# Patient Record
Sex: FEMALE | Race: AMERICAN INDIAN OR ALASKA NATIVE | NOT HISPANIC OR LATINO | Employment: OTHER | URBAN - METROPOLITAN AREA
[De-identification: names, ages, dates, MRNs, and addresses within clinical notes are randomized per-mention and may not be internally consistent; named-entity substitution may affect disease eponyms.]

---

## 2017-01-17 ENCOUNTER — HOSPITAL ENCOUNTER (EMERGENCY)
Facility: HOSPITAL | Age: 47
Discharge: HOME | End: 2017-01-17
Attending: EMERGENCY MEDICINE
Payer: COMMERCIAL

## 2017-01-17 VITALS
HEART RATE: 69 BPM | HEIGHT: 61 IN | WEIGHT: 120 LBS | RESPIRATION RATE: 18 BRPM | DIASTOLIC BLOOD PRESSURE: 79 MMHG | TEMPERATURE: 98 F | OXYGEN SATURATION: 98 % | SYSTOLIC BLOOD PRESSURE: 115 MMHG | BODY MASS INDEX: 22.66 KG/M2

## 2017-01-17 DIAGNOSIS — H66.90 OTITIS MEDIA, UNSPECIFIED CHRONICITY, UNSPECIFIED LATERALITY, UNSPECIFIED OTITIS MEDIA TYPE: Primary | ICD-10-CM

## 2017-01-17 DIAGNOSIS — R07.9 CHEST PAIN: ICD-10-CM

## 2017-01-17 LAB
ANION GAP SERPL CALC-SCNC: 13 MMOL/L
BASOPHILS # BLD AUTO: 0.02 K/UL
BASOPHILS NFR BLD: 0.2 %
BUN SERPL-MCNC: 16 MG/DL
CALCIUM SERPL-MCNC: 8.6 MG/DL
CHLORIDE SERPL-SCNC: 111 MMOL/L
CO2 SERPL-SCNC: 23 MMOL/L
CREAT SERPL-MCNC: 0.8 MG/DL
DIFFERENTIAL METHOD: ABNORMAL
EOSINOPHIL # BLD AUTO: 0.2 K/UL
EOSINOPHIL NFR BLD: 1.9 %
ERYTHROCYTE [DISTWIDTH] IN BLOOD BY AUTOMATED COUNT: 14.7 %
EST. GFR  (AFRICAN AMERICAN): >60 ML/MIN/1.73 M^2
EST. GFR  (NON AFRICAN AMERICAN): >60 ML/MIN/1.73 M^2
GLUCOSE SERPL-MCNC: 85 MG/DL
HCT VFR BLD AUTO: 36.1 %
HGB BLD-MCNC: 12.3 G/DL
INR PPP: 1
LYMPHOCYTES # BLD AUTO: 4.3 K/UL
LYMPHOCYTES NFR BLD: 41 %
MCH RBC QN AUTO: 29.9 PG
MCHC RBC AUTO-ENTMCNC: 34.1 %
MCV RBC AUTO: 88 FL
MONOCYTES # BLD AUTO: 0.6 K/UL
MONOCYTES NFR BLD: 5.7 %
NEUTROPHILS # BLD AUTO: 5.4 K/UL
NEUTROPHILS NFR BLD: 51.2 %
PLATELET # BLD AUTO: 215 K/UL
PMV BLD AUTO: 11.5 FL
POTASSIUM SERPL-SCNC: 3 MMOL/L
PROTHROMBIN TIME: 10.6 SEC
RBC # BLD AUTO: 4.11 M/UL
SODIUM SERPL-SCNC: 147 MMOL/L
TROPONIN I SERPL DL<=0.01 NG/ML-MCNC: <0.006 NG/ML
WBC # BLD AUTO: 10.55 K/UL

## 2017-01-17 PROCEDURE — 80048 BASIC METABOLIC PNL TOTAL CA: CPT

## 2017-01-17 PROCEDURE — 93010 ELECTROCARDIOGRAM REPORT: CPT | Mod: ,,, | Performed by: INTERNAL MEDICINE

## 2017-01-17 PROCEDURE — 84484 ASSAY OF TROPONIN QUANT: CPT

## 2017-01-17 PROCEDURE — 85610 PROTHROMBIN TIME: CPT

## 2017-01-17 PROCEDURE — 93005 ELECTROCARDIOGRAM TRACING: CPT

## 2017-01-17 PROCEDURE — 85025 COMPLETE CBC W/AUTO DIFF WBC: CPT

## 2017-01-17 PROCEDURE — 99284 EMERGENCY DEPT VISIT MOD MDM: CPT | Mod: 25

## 2017-01-17 RX ORDER — FLUOXETINE HYDROCHLORIDE 20 MG/1
20 CAPSULE ORAL DAILY
COMMUNITY
End: 2021-03-08 | Stop reason: SDUPTHER

## 2017-01-17 RX ORDER — PANTOPRAZOLE SODIUM 40 MG/1
40 TABLET, DELAYED RELEASE ORAL DAILY
COMMUNITY
End: 2021-03-08 | Stop reason: SDUPTHER

## 2017-01-17 RX ORDER — CLONAZEPAM 1 MG/1
1 TABLET ORAL 2 TIMES DAILY PRN
COMMUNITY
End: 2021-03-08 | Stop reason: SDUPTHER

## 2017-01-17 RX ORDER — PROMETHAZINE HYDROCHLORIDE 25 MG/1
25 TABLET ORAL EVERY 6 HOURS PRN
COMMUNITY
End: 2021-03-08

## 2017-01-17 RX ORDER — ASPIRIN 81 MG/1
81 TABLET ORAL DAILY
COMMUNITY
End: 2021-03-08 | Stop reason: SDUPTHER

## 2017-01-17 RX ORDER — AZITHROMYCIN 250 MG/1
250 TABLET, FILM COATED ORAL DAILY
Qty: 6 TABLET | Refills: 0 | Status: SHIPPED | OUTPATIENT
Start: 2017-01-17 | End: 2017-01-27

## 2017-01-17 RX ORDER — VENLAFAXINE HYDROCHLORIDE 75 MG/1
75 CAPSULE, EXTENDED RELEASE ORAL DAILY
COMMUNITY
End: 2021-03-08

## 2017-01-17 RX ORDER — GABAPENTIN 300 MG/1
300 CAPSULE ORAL 3 TIMES DAILY
COMMUNITY
End: 2021-03-08 | Stop reason: SDUPTHER

## 2017-01-17 RX ORDER — LANOLIN ALCOHOL/MO/W.PET/CERES
400 CREAM (GRAM) TOPICAL DAILY
Status: ON HOLD | COMMUNITY
End: 2021-12-08 | Stop reason: HOSPADM

## 2017-01-17 RX ORDER — BUDESONIDE AND FORMOTEROL FUMARATE DIHYDRATE 80; 4.5 UG/1; UG/1
2 AEROSOL RESPIRATORY (INHALATION)
COMMUNITY
End: 2021-02-04 | Stop reason: SDUPTHER

## 2017-01-17 RX ORDER — METOPROLOL TARTRATE 25 MG/1
25 TABLET, FILM COATED ORAL 2 TIMES DAILY
COMMUNITY
End: 2021-01-12 | Stop reason: SDUPTHER

## 2017-01-17 RX ORDER — DULOXETIN HYDROCHLORIDE 60 MG/1
60 CAPSULE, DELAYED RELEASE ORAL DAILY
COMMUNITY
End: 2021-03-08

## 2017-01-17 RX ORDER — LISINOPRIL 2.5 MG/1
2.5 TABLET ORAL DAILY
COMMUNITY
End: 2021-01-12 | Stop reason: SDUPTHER

## 2017-01-17 RX ORDER — IBUPROFEN 800 MG/1
800 TABLET ORAL 2 TIMES DAILY
COMMUNITY
End: 2021-04-27 | Stop reason: SDUPTHER

## 2017-01-17 RX ORDER — DIVALPROEX SODIUM 500 MG/1
500 TABLET, FILM COATED, EXTENDED RELEASE ORAL DAILY
COMMUNITY
End: 2021-03-08

## 2017-01-17 NOTE — ED AVS SNAPSHOT
[facility] -   [address] [place] [id number]               Chrystal Padilla   2017  9:34 PM   ED    Description:  Female : 1970   Department:  [facility]           Your Care was Coordinated By:     Provider Role From To    Tavares Ngerete MD Attending Provider 17 [id number] --      Reason for Visit     Chest Pain     Otalgia           Diagnoses this Visit        Comments    Otitis media, unspecified chronicity, unspecified laterality, unspecified otitis media type    -  Primary     Chest pain           ED Disposition     None           To Do List           Follow-up Information     Follow up with [facility] In 1 day.    Specialty:  Emergency Medicine    Why:  If symptoms worsen. Patient should return to the ED for any concerns or worsening of condition.    Contact information:    [address] [place] [id number] [phone]       These Medications        Disp Refills Start End    azithromycin (Z-AYO) 250 MG tablet 6 tablet 0 2017    Take 1 tablet (250 mg total) by mouth once daily. Take first 2 tablets together, then 1 every day until finished. - Oral      [facility]     [facility] -  Assistance  Registered nurses in the [facility] provide clinical advisement, health education, appointment booking, and other advisory services.  Call for this free service at [phone].             Medications           Message regarding Medications     Verify the changes and/or additions to your medication regime listed below are the same as discussed with your clinician today.  If any of these changes or additions are incorrect, please notify your healthcare provider.        START taking these NEW medications        Refills    azithromycin (Z-AYO) 250 MG tablet 0    Sig: Take 1 tablet (250 mg total) by mouth once daily. Take  "first 2 tablets together, then 1 every day until finished.    Class: Print    Route: Oral           Verify that the below list of medications is an accurate representation of the medications you are currently taking.  If none reported, the list may be blank. If incorrect, please contact your healthcare provider. Carry this list with you in case of emergency.           Current Medications     aspirin (ECOTRIN) 81 MG EC tablet Take 81 mg by mouth once daily.    budesonide-formoterol 80-4.5 mcg (SYMBICORT) 80-4.5 mcg/actuation HFAA Inhale 2 puffs into the lungs.    clonazePAM (KLONOPIN) 1 MG tablet Take 1 mg by mouth 2 (two) times daily as needed for Anxiety.    divalproex (DEPAKOTE) 500 MG Tb24 Take 500 mg by mouth once daily.    duloxetine (CYMBALTA) 60 MG capsule Take 60 mg by mouth once daily.    fluoxetine (PROZAC) 20 MG capsule Take 20 mg by mouth once daily.    gabapentin (NEURONTIN) 300 MG capsule Take 300 mg by mouth 3 (three) times daily.    ibuprofen (ADVIL,MOTRIN) 800 MG tablet Take 800 mg by mouth 2 (two) times daily.    lisinopril (PRINIVIL,ZESTRIL) 2.5 MG tablet Take 2.5 mg by mouth once daily.    magnesium oxide (MAG-OX) 400 mg tablet Take 400 mg by mouth once daily.    metoprolol tartrate (LOPRESSOR) 25 MG tablet Take 25 mg by mouth 2 (two) times daily.    pantoprazole (PROTONIX) 40 MG tablet Take 40 mg by mouth once daily.    promethazine (PHENERGAN) 25 MG tablet Take 25 mg by mouth every 6 (six) hours as needed for Nausea.    venlafaxine (EFFEXOR-XR) 75 MG 24 hr capsule Take 75 mg by mouth once daily.    azithromycin (Z-AYO) 250 MG tablet Take 1 tablet (250 mg total) by mouth once daily. Take first 2 tablets together, then 1 every day until finished.           Clinical Reference Information           Your Vitals Were     BP Pulse Temp Resp Height Weight    118/81 (Patient Position: Sitting) 78 98.1 °F (36.7 °C) (Oral) 19 5' 1" (1.549 m) 54.4 kg (120 lb)    SpO2 BMI             98% 22.67 kg/m2       "   Allergies as of 1/17/2017        Reactions    Bactrim [Sulfamethoxazole-trimethoprim]     Morphine     Pcn [Penicillins]       Immunizations Administered on Date of Encounter - 1/17/2017     None      ED Micro, Lab, POCT     Start Ordered       Status Ordering Provider    01/17/17 2210 01/17/17 2209  CBC auto differential  STAT      Final result     01/17/17 2210 01/17/17 2209  Basic metabolic panel  STAT      Final result     01/17/17 2210 01/17/17 2209  Protime-INR  STAT      Final result     01/17/17 2210 01/17/17 2209  Troponin I  STAT      Final result       ED Imaging Orders     Start Ordered       Status Ordering Provider    01/17/17 2210 01/17/17 2209  X-Ray Chest 1 View  1 time imaging      In process         Discharge Instructions         Noncardiac Chest Pain    Based on your visit today, the health care provider doesnt know what is causing your chest pain. In most cases, people who come to the emergency department with chest pain dont have a problem with their heart. Instead, the pain is caused by other conditions. These may be problems with the lungs, muscles, bones, digestive tract, nerves, or mental health.  Lung problems  · Inflammation around the lungs (pleurisy)  · Collapsed lung (pneumothorax)  · Fluid around the lungs (pleural effusion)  · Lung cancer. This is a rare cause of chest pain.  Muscle or bone problems  · Inflamed cartilage between the ribs (pleurisy)  · Fibromyalgia  · Rheumatoid arthritis  Digestive system problems  · Reflux  · Stomach ulcer  · Spasms of the esophagus  · Gall stones  · Gallbladder inflammation  Mental health conditions  · Panic or anxiety attacks  · Emotional distress  Your condition doesnt seem serious and your pain doesnt appear to be coming from your heart. But sometimes the signs of a serious problem take more time to appear. Watch for the warning signs listed below.  Home care  Follow these guidelines when caring for yourself at home:  · Rest today and  avoid strenuous activity.  · Take any prescribed medicine as directed.  Follow-up care  Follow up with your health care provider, or as advised, if you dont start to feel better within 24 hours.  When to seek medical advice  Call your health care provider right away if any of these occur:  · A change in the type of pain. Call if it feels different, becomes more serious, lasts longer, or begins to spread into your shoulder, arm, neck, jaw, or back.  · Shortness of breath  · You feel more pain when you breathe  · Cough with dark-colored mucus or blood  · Weakness, dizziness, or fainting  · Fever of 100.4ºF (38ºC) or higher, or as directed by your health care provider  · Swelling, pain, or redness in one leg  © 8912-7383 [employer]. [address] [place] [address]. All rights reserved. This information is not intended as a substitute for professional medical care. Always follow your healthcare professional's instructions.          [facility] Sign-Up     Activating your [facility] account is as easy as 1-2-3!     1) Visit [email or web address], select Sign Up Now, enter this activation code and your date of birth, then select Next.  [email or web address]  Expires: 3/3/2017 11:54 PM      2) Create a username and password to use when you visit [facility] in the future and select a security question in case you lose your password and select Next.    3) Enter your e-mail address and click Sign Up!    Additional Information  If you have questions, please e-mail [email or web address] or call [phone] to talk to our [facility] staff. Remember, [facility] is NOT to be used for urgent needs. For medical emergencies, dial 911.         Smoking Cessation     If you would like to quit smoking:   You may be eligible for free services if you are a [place] resident and started smoking cigarettes before September 1, 1988.  Call the [employer]oking [employer]essation [employer] (SCT) toll free at (015) 255-0743 or (688) 541-1711.   Call  1-800-QUIT-NOW if you do not meet the above criteria.             [facility] complies with applicable Federal civil rights laws and does not discriminate on the basis of race, color, national origin, age, disability, or sex.        Language Assistance Services     ATTENTION: Language assistance services are available, free of charge. Please call [phone].      ATENCIÓN: Si habla español, tiene a purdy disposición servicios gratuitos de asistencia lingüística. Llame al [phone].     CHÚ Ý: N?u b?n nói Ti?ng Vi?t, có các d?ch v? h? tr? ngôn ng? mi?n phí dành cho b?n. G?i s? [phone].

## 2017-01-18 NOTE — DISCHARGE INSTRUCTIONS
Noncardiac Chest Pain    Based on your visit today, the health care provider doesnt know what is causing your chest pain. In most cases, people who come to the emergency department with chest pain dont have a problem with their heart. Instead, the pain is caused by other conditions. These may be problems with the lungs, muscles, bones, digestive tract, nerves, or mental health.  Lung problems  · Inflammation around the lungs (pleurisy)  · Collapsed lung (pneumothorax)  · Fluid around the lungs (pleural effusion)  · Lung cancer. This is a rare cause of chest pain.  Muscle or bone problems  · Inflamed cartilage between the ribs (pleurisy)  · Fibromyalgia  · Rheumatoid arthritis  Digestive system problems  · Reflux  · Stomach ulcer  · Spasms of the esophagus  · Gall stones  · Gallbladder inflammation  Mental health conditions  · Panic or anxiety attacks  · Emotional distress  Your condition doesnt seem serious and your pain doesnt appear to be coming from your heart. But sometimes the signs of a serious problem take more time to appear. Watch for the warning signs listed below.  Home care  Follow these guidelines when caring for yourself at home:  · Rest today and avoid strenuous activity.  · Take any prescribed medicine as directed.  Follow-up care  Follow up with your health care provider, or as advised, if you dont start to feel better within 24 hours.  When to seek medical advice  Call your health care provider right away if any of these occur:  · A change in the type of pain. Call if it feels different, becomes more serious, lasts longer, or begins to spread into your shoulder, arm, neck, jaw, or back.  · Shortness of breath  · You feel more pain when you breathe  · Cough with dark-colored mucus or blood  · Weakness, dizziness, or fainting  · Fever of 100.4ºF (38ºC) or higher, or as directed by your health care provider  · Swelling, pain, or redness in one leg  © 7447-6348 [employer]. [address]  [place] [address]. All rights reserved. This information is not intended as a substitute for professional medical care. Always follow your healthcare professional's instructions.

## 2017-01-18 NOTE — ED NOTES
Dr. casillas reviewed result with pt. Discussed discharge instructions. Questions answered. Pt verbalizes understanding. No further complaints or concerns. Pt stable for discharge.

## 2017-01-18 NOTE — ED PROVIDER NOTES
"SCRIBE #1 NOTE: I, Deidra Baxter, am scribing for, and in the presence of, Tavares Negrete MD. I have scribed the entire note.      History      Chief Complaint   Patient presents with    Chest Pain     x 2 hours.     Otalgia     right ear       Review of patient's allergies indicates:   Allergen Reactions    Bactrim [sulfamethoxazole-trimethoprim]     Pcn [penicillins]         HPI   HPI    2017, 9:17 PM   History obtained from the patient      History of Present Illness: Chrystal Padilla is a 46 y.o. female patient who presents to the Emergency Department for localized CP in left anterior wall which onset gradually two days ago. Symptoms are intermittent and moderate in severity. The patient describes the sxs as a "stabbing pain". Episodes are 15 minutes in duration. Sxs are mitigated with Klonopin. No other mitigating or exacerbating factors reported. Associated sxs include ear pain. Patient denies any SOB, chest tightness, palpitations, diaphoresis, extremity weakness/numbness, leg swelling, dizziness, cough, N/V/D, fever, and all other sxs at this time. Pt reports having an MI on 2016 with similar sxs and SOB. Pt was outside of the ED smoking when she was called back to her room. No further complaints or concerns at this time.         Arrival mode: Personal vehicle    PCP: No primary care provider on file.       Past Medical History:  Past Medical History   Diagnosis Date    CHF (congestive heart failure)     CLL (chronic lymphocytic leukemia)     COPD (chronic obstructive pulmonary disease)     DDD (degenerative disc disease), lumbar     History of cervical cancer     Hypertension     Immune deficiency disorder     Myocardial infarction     Rheumatoid arthritis     Scoliosis     Spinal stenosis     Stroke        Past Surgical History:  Past Surgical History   Procedure Laterality Date    Hysterectomy       section      Facial reconstruction surgery Right  "    Breast surgery       breast augmentation         Family History:  History reviewed. No pertinent family history.    Social History:  Social History     Social History Main Topics    Smoking status: Current Every Day Smoker     Packs/day: 1.00     Types: Cigarettes    Smokeless tobacco: Unknown    Alcohol use No    Drug use: No    Sexual activity: Unknown       ROS   Review of Systems   Constitutional: Negative for diaphoresis and fever.   HENT: Positive for ear pain. Negative for sore throat.    Respiratory: Negative for cough, chest tightness and shortness of breath.    Cardiovascular: Positive for chest pain. Negative for palpitations and leg swelling.   Gastrointestinal: Negative for nausea and vomiting.   Genitourinary: Negative for dysuria.   Musculoskeletal: Negative for back pain.   Skin: Negative for rash.   Neurological: Negative for dizziness, weakness and numbness.   Hematological: Does not bruise/bleed easily.   All other systems reviewed and are negative.      Physical Exam    Initial Vitals   BP Pulse Resp Temp SpO2   01/17/17 2008 01/17/17 2008 01/17/17 2008 01/17/17 2008 01/17/17 2008   118/81 78 19 98.1 °F (36.7 °C) 98 %      Physical Exam  Nursing Notes and Vital Signs Reviewed.  Constitutional: Patient is in no acute distress. Awake and alert. Well-developed and well-nourished.  Head: Atraumatic. Normocephalic.  Eyes: PERRL. EOM intact. Conjunctivae are not pale. No scleral icterus.  ENT: Mucous membranes are moist. Oropharynx is clear and symmetric. TM intact. Fluid noted behind right ear suggesting infection.    Neck: Supple. Full ROM. No lymphadenopathy.  Cardiovascular: Regular rate. Regular rhythm. No murmurs, rubs, or gallops. Distal pulses are 2+ and symmetric.  Pulmonary/Chest: No respiratory distress. Clear to auscultation bilaterally. No wheezing, rales, or rhonchi.  Abdominal: Soft and non-distended.  There is no tenderness.  No rebound, guarding, or rigidity.  Musculoskeletal:  "Moves all extremities. No obvious deformities. No edema. No calf tenderness.  Skin: Warm and dry.  Neurological:  Alert, awake, and appropriate.  Normal speech.  Grossly neurologically intact.  Psychiatric: Normal affect. Good eye contact. Appropriate in content.    ED Course    Procedures  ED Vital Signs:  Vitals:    01/17/17 2008 01/17/17 2213 01/17/17 2359   BP: 118/81  115/79   Pulse: 78 78 69   Resp: 19  18   Temp: 98.1 °F (36.7 °C)     TempSrc: Oral     SpO2: 98%  98%   Weight: 54.4 kg (120 lb)     Height: 5' 1" (1.549 m)         Abnormal Lab Results:  Labs Reviewed   CBC W/ AUTO DIFFERENTIAL - Abnormal; Notable for the following:        Result Value    Hematocrit 36.1 (*)     RDW 14.7 (*)     All other components within normal limits   BASIC METABOLIC PANEL - Abnormal; Notable for the following:     Sodium 147 (*)     Potassium 3.0 (*)     Chloride 111 (*)     Calcium 8.6 (*)     All other components within normal limits   PROTIME-INR   TROPONIN I        All Lab Results:  Results for orders placed or performed during the hospital encounter of 01/17/17   CBC auto differential   Result Value Ref Range    WBC 10.55 3.90 - 12.70 K/uL    RBC 4.11 4.00 - 5.40 M/uL    Hemoglobin 12.3 12.0 - 16.0 g/dL    Hematocrit 36.1 (L) 37.0 - 48.5 %    MCV 88 82 - 98 fL    MCH 29.9 27.0 - 31.0 pg    MCHC 34.1 32.0 - 36.0 %    RDW 14.7 (H) 11.5 - 14.5 %    Platelets 215 150 - 350 K/uL    MPV 11.5 9.2 - 12.9 fL    Gran # 5.4 1.8 - 7.7 K/uL    Lymph # 4.3 1.0 - 4.8 K/uL    Mono # 0.6 0.3 - 1.0 K/uL    Eos # 0.2 0.0 - 0.5 K/uL    Baso # 0.02 0.00 - 0.20 K/uL    Gran% 51.2 38.0 - 73.0 %    Lymph% 41.0 18.0 - 48.0 %    Mono% 5.7 4.0 - 15.0 %    Eosinophil% 1.9 0.0 - 8.0 %    Basophil% 0.2 0.0 - 1.9 %    Differential Method Automated    Basic metabolic panel   Result Value Ref Range    Sodium 147 (H) 136 - 145 mmol/L    Potassium 3.0 (L) 3.5 - 5.1 mmol/L    Chloride 111 (H) 95 - 110 mmol/L    CO2 23 23 - 29 mmol/L    Glucose 85 70 - " 110 mg/dL    BUN, Bld 16 6 - 20 mg/dL    Creatinine 0.8 0.5 - 1.4 mg/dL    Calcium 8.6 (L) 8.7 - 10.5 mg/dL    Anion Gap 13 8 - 16 mmol/L    eGFR if African American >60 >60 mL/min/1.73 m^2    eGFR if non African American >60 >60 mL/min/1.73 m^2   Protime-INR   Result Value Ref Range    Prothrombin Time 10.6 9.0 - 12.5 sec    INR 1.0 0.8 - 1.2   Troponin I   Result Value Ref Range    Troponin I <0.006 0.000 - 0.026 ng/mL         Imaging Results:  Imaging Results         X-Ray Chest 1 View  No acute findings   X-Ray was read by Tavares Negrete MD       The EKG was ordered, reviewed, and independently interpreted by the ED provider.  Interpretation time: 20:17  Rate: 70 BPM  Rhythm: normal sinus rhythm  Interpretation: QRS, ST segment, T wave, and axis are normal. No STEMI.             The Emergency Provider reviewed the vital signs and test results, which are outlined above.    ED Discussion     12:06 AM: Reassessed pt at this time.  Pt is awake, alert and lying comfortably in ED bed. Pt is in NAD. Discussed with pt all pertinent ED information and results. Discussed pt dx and plan of tx. Gave pt all f/u and return to the ED instructions. All questions and concerns were addressed at this time. Pt expresses understanding of information and instructions, and is comfortable with plan to discharge. Pt is stable for discharge.    I have discussed with patient and/or family/caretaker chest pain precautions, specifically to return for worsening chest pain, shortness of breath, fever, or any concern.  I have low suspicion for cardiopulmonary, vascular, infectious, respiratory, or other emergent medical condition based on my evaluation in the ED.      ED Medication(s):  Medications - No data to display    Discharge Medication List as of 1/17/2017 11:54 PM      START taking these medications    Details   azithromycin (Z-AYO) 250 MG tablet Take 1 tablet (250 mg total) by mouth once daily. Take first 2 tablets together,  then 1 every day until finished., Starting 1/17/2017, Until Fri 1/27/17, Print             Follow-up Information     Follow up with [facility] In 1 day.    Specialty:  Emergency Medicine    Why:  If symptoms worsen. Patient should return to the ED for any concerns or worsening of condition.    Contact information:    [address]  [facility] [address] [phone]            Medical Decision Making    Medical Decision Making:   Clinical Tests:   Lab Tests: Ordered and Reviewed  Radiological Study: Ordered and Reviewed  Medical Tests: Ordered and Reviewed     Additional MDM:   Smoking Cessation: The patient was counseled on tobacco related  health complications.        Scribe Attestation:   Scribe #1: I performed the above scribed service and the documentation accurately describes the services I performed. I attest to the accuracy of the note.    Attending:   Physician Attestation Statement for Scribe #1: I, Tavares Negrete MD, personally performed the services described in this documentation, as scribed by Deidra Baxter, in my presence, and it is both accurate and complete.          Clinical Impression       ICD-10-CM ICD-9-CM   1. Otitis media, unspecified chronicity, unspecified laterality, unspecified otitis media type H66.90 382.9   2. Chest pain R07.9 786.50       Disposition:   Disposition: Discharged  Condition: Stable    [     Tavares Negrete MD  01/18/17 0523

## 2021-01-12 PROBLEM — S09.93XA FACIAL INJURY: Status: ACTIVE | Noted: 2017-02-07

## 2021-01-12 PROBLEM — J34.89 NASAL OBSTRUCTION: Status: ACTIVE | Noted: 2017-02-07

## 2021-01-12 PROBLEM — J34.2 DEVIATED SEPTUM: Status: ACTIVE | Noted: 2017-02-07

## 2021-01-12 PROBLEM — J34.3 HYPERTROPHY OF BOTH INFERIOR NASAL TURBINATES: Status: ACTIVE | Noted: 2017-02-07

## 2021-01-12 PROBLEM — J30.1 SEASONAL ALLERGIC RHINITIS DUE TO POLLEN: Status: ACTIVE | Noted: 2017-02-23

## 2021-03-08 PROBLEM — F43.10 PTSD (POST-TRAUMATIC STRESS DISORDER): Status: ACTIVE | Noted: 2021-03-08

## 2021-04-27 PROBLEM — E78.49 OTHER HYPERLIPIDEMIA: Status: ACTIVE | Noted: 2021-04-27

## 2021-10-26 PROBLEM — I95.0 IDIOPATHIC HYPOTENSION: Status: ACTIVE | Noted: 2021-10-26

## 2021-10-26 PROBLEM — K21.9 GERD (GASTROESOPHAGEAL REFLUX DISEASE): Status: ACTIVE | Noted: 2021-10-26

## 2021-10-26 PROBLEM — I10 HYPERTENSION: Status: RESOLVED | Noted: 2021-10-26 | Resolved: 2021-10-26

## 2021-10-26 PROBLEM — I10 HYPERTENSION: Status: ACTIVE | Noted: 2021-10-26
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
## 2021-10-26 PROBLEM — R56.9 SEIZURES: Status: ACTIVE | Noted: 2021-10-26

## 2021-10-27 PROBLEM — R33.9 URINARY RETENTION: Status: ACTIVE | Noted: 2021-10-27

## 2021-10-29 PROBLEM — F43.10 PTSD (POST-TRAUMATIC STRESS DISORDER): Status: RESOLVED | Noted: 2021-03-08 | Resolved: 2021-10-29

## 2021-10-29 PROBLEM — S09.93XA FACIAL INJURY: Status: RESOLVED | Noted: 2017-02-07 | Resolved: 2021-10-29

## 2021-10-29 PROBLEM — R33.9 URINARY RETENTION: Status: RESOLVED | Noted: 2021-10-27 | Resolved: 2021-10-29

## 2021-12-02 PROBLEM — F32.A DEPRESSION, UNSPECIFIED: Status: ACTIVE | Noted: 2021-12-02

## 2021-12-02 PROBLEM — F17.200 TOBACCO USE DISORDER: Status: ACTIVE | Noted: 2021-12-02

## 2021-12-02 PROBLEM — E87.6 HYPOKALEMIA: Status: ACTIVE | Noted: 2021-12-02

## 2021-12-02 PROBLEM — N39.0 URINARY TRACT INFECTION WITHOUT HEMATURIA: Status: ACTIVE | Noted: 2021-12-02

## 2021-12-02 PROBLEM — M54.12 CERVICAL RADICULOPATHY: Status: ACTIVE | Noted: 2021-12-02

## 2021-12-02 PROBLEM — Z91.81 PERSONAL HISTORY OF FALL: Status: ACTIVE | Noted: 2021-12-02

## 2021-12-02 PROBLEM — F33.3 SEVERE EPISODE OF RECURRENT MAJOR DEPRESSIVE DISORDER, WITH PSYCHOTIC FEATURES: Status: ACTIVE | Noted: 2021-12-02

## 2021-12-06 ENCOUNTER — PATIENT MESSAGE (OUTPATIENT)
Dept: RESEARCH | Facility: HOSPITAL | Age: 51
End: 2021-12-06

## 2023-07-07 PROBLEM — Z85.6 HISTORY OF LEUKEMIA: Status: ACTIVE | Noted: 2023-07-07
